# Patient Record
Sex: MALE | URBAN - METROPOLITAN AREA
[De-identification: names, ages, dates, MRNs, and addresses within clinical notes are randomized per-mention and may not be internally consistent; named-entity substitution may affect disease eponyms.]

---

## 2019-06-21 ENCOUNTER — OFFICE VISIT (OUTPATIENT)
Dept: URBAN - METROPOLITAN AREA CLINIC 62 | Facility: CLINIC | Age: 54
End: 2019-06-21
Payer: OTHER GOVERNMENT

## 2019-06-21 DIAGNOSIS — H25.13 AGE-RELATED NUCLEAR CATARACT, BILATERAL: Primary | ICD-10-CM

## 2019-06-21 PROCEDURE — 92004 COMPRE OPH EXAM NEW PT 1/>: CPT | Performed by: OPHTHALMOLOGY

## 2019-06-21 ASSESSMENT — KERATOMETRY
OD: 43.13
OS: 42.75

## 2019-06-21 ASSESSMENT — INTRAOCULAR PRESSURE
OS: 13
OD: 12

## 2019-06-21 ASSESSMENT — VISUAL ACUITY: OS: 20/60

## 2019-06-21 NOTE — IMPRESSION/PLAN
Impression: Age-related nuclear cataract, bilateral: H25.13. Plan: Decreased vision likely due to cataracts. Vision decreased to futher extent than expected by cataracts. OCT macula normal.  Rec. eval retina specialist to r/o any other etiologies. Rec. surgery once cleared by retina.